# Patient Record
Sex: FEMALE | Race: WHITE
[De-identification: names, ages, dates, MRNs, and addresses within clinical notes are randomized per-mention and may not be internally consistent; named-entity substitution may affect disease eponyms.]

---

## 2017-05-19 ENCOUNTER — HOSPITAL ENCOUNTER (OUTPATIENT)
Dept: HOSPITAL 47 - LABWHC1 | Age: 82
Discharge: HOME | End: 2017-05-19
Payer: MEDICARE

## 2017-05-19 DIAGNOSIS — E78.5: Primary | ICD-10-CM

## 2017-05-19 DIAGNOSIS — I10: ICD-10-CM

## 2017-05-19 DIAGNOSIS — M81.0: ICD-10-CM

## 2017-05-19 LAB
ALP SERPL-CCNC: 68 U/L (ref 38–126)
ALT SERPL-CCNC: 29 U/L (ref 9–52)
ANION GAP SERPL CALC-SCNC: 11 MMOL/L
AST SERPL-CCNC: 24 U/L (ref 14–36)
BASOPHILS # BLD AUTO: 0.1 K/UL (ref 0–0.2)
BASOPHILS NFR BLD AUTO: 1 %
BUN SERPL-SCNC: 26 MG/DL (ref 7–17)
CALCIUM SPEC-MCNC: 9.5 MG/DL (ref 8.4–10.2)
CH: 29.5
CHCM: 33.2
CHLORIDE SERPL-SCNC: 105 MMOL/L (ref 98–107)
CHOLEST SERPL-MCNC: 187 MG/DL (ref ?–200)
CO2 SERPL-SCNC: 26 MMOL/L (ref 22–30)
EOSINOPHIL # BLD AUTO: 0.2 K/UL (ref 0–0.7)
EOSINOPHIL NFR BLD AUTO: 3 %
ERYTHROCYTE [DISTWIDTH] IN BLOOD BY AUTOMATED COUNT: 4.33 M/UL (ref 3.8–5.4)
ERYTHROCYTE [DISTWIDTH] IN BLOOD: 12.9 % (ref 11.5–15.5)
GLUCOSE SERPL-MCNC: 96 MG/DL (ref 74–99)
HCT VFR BLD AUTO: 38.6 % (ref 34–46)
HDLC SERPL-MCNC: 71 MG/DL (ref 40–60)
HDW: 2.43
HGB BLD-MCNC: 12.9 GM/DL (ref 11.4–16)
LUC NFR BLD AUTO: 2 %
LYMPHOCYTES # SPEC AUTO: 1.9 K/UL (ref 1–4.8)
LYMPHOCYTES NFR SPEC AUTO: 28 %
MCH RBC QN AUTO: 29.7 PG (ref 25–35)
MCHC RBC AUTO-ENTMCNC: 33.3 G/DL (ref 31–37)
MCV RBC AUTO: 89.1 FL (ref 80–100)
MONOCYTES # BLD AUTO: 0.4 K/UL (ref 0–1)
MONOCYTES NFR BLD AUTO: 6 %
NEUTROPHILS # BLD AUTO: 4.2 K/UL (ref 1.3–7.7)
NEUTROPHILS NFR BLD AUTO: 60 %
NON-AFRICAN AMERICAN GFR(MDRD): 53
POTASSIUM SERPL-SCNC: 4.1 MMOL/L (ref 3.5–5.1)
PROT SERPL-MCNC: 7.7 G/DL (ref 6.3–8.2)
SODIUM SERPL-SCNC: 142 MMOL/L (ref 137–145)
TRIGL SERPL-MCNC: 94 MG/DL (ref ?–150)
WBC # BLD AUTO: 0.15 10*3/UL
WBC # BLD AUTO: 6.9 K/UL (ref 3.8–10.6)
WBC (PEROX): 6.35

## 2017-05-19 PROCEDURE — 80053 COMPREHEN METABOLIC PANEL: CPT

## 2017-05-19 PROCEDURE — 84443 ASSAY THYROID STIM HORMONE: CPT

## 2017-05-19 PROCEDURE — 99213 OFFICE O/P EST LOW 20 MIN: CPT

## 2017-05-19 PROCEDURE — 82306 VITAMIN D 25 HYDROXY: CPT

## 2017-05-19 PROCEDURE — 85025 COMPLETE CBC W/AUTO DIFF WBC: CPT

## 2017-05-19 PROCEDURE — 36415 COLL VENOUS BLD VENIPUNCTURE: CPT

## 2017-05-19 PROCEDURE — 80061 LIPID PANEL: CPT

## 2017-05-19 PROCEDURE — 84439 ASSAY OF FREE THYROXINE: CPT

## 2017-12-18 ENCOUNTER — HOSPITAL ENCOUNTER (EMERGENCY)
Dept: HOSPITAL 47 - EC | Age: 82
Discharge: HOME | End: 2017-12-18
Payer: MEDICARE

## 2017-12-18 VITALS — DIASTOLIC BLOOD PRESSURE: 82 MMHG | TEMPERATURE: 98.2 F | SYSTOLIC BLOOD PRESSURE: 147 MMHG

## 2017-12-18 VITALS — RESPIRATION RATE: 18 BRPM | HEART RATE: 82 BPM

## 2017-12-18 DIAGNOSIS — S70.01XA: Primary | ICD-10-CM

## 2017-12-18 DIAGNOSIS — Z79.82: ICD-10-CM

## 2017-12-18 DIAGNOSIS — Z88.2: ICD-10-CM

## 2017-12-18 DIAGNOSIS — Z87.891: ICD-10-CM

## 2017-12-18 DIAGNOSIS — Z87.440: ICD-10-CM
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
## 2017-12-18 DIAGNOSIS — Z79.899: ICD-10-CM

## 2017-12-18 DIAGNOSIS — Z96.641: ICD-10-CM

## 2017-12-18 DIAGNOSIS — M19.90: ICD-10-CM

## 2017-12-18 PROCEDURE — 99284 EMERGENCY DEPT VISIT MOD MDM: CPT

## 2017-12-18 PROCEDURE — 73502 X-RAY EXAM HIP UNI 2-3 VIEWS: CPT

## 2017-12-18 PROCEDURE — 70450 CT HEAD/BRAIN W/O DYE: CPT

## 2017-12-18 PROCEDURE — 72125 CT NECK SPINE W/O DYE: CPT

## 2017-12-18 PROCEDURE — 96372 THER/PROPH/DIAG INJ SC/IM: CPT

## 2017-12-18 NOTE — ED
Fall HPI





- General


Chief Complaint: Fall


Stated Complaint: Fall


Time Seen by Provider: 12/18/17 17:28


Source: patient, EMS, RN notes reviewed


Mode of arrival: EMS





- History of Present Illness


Initial Comments: 





This is an 86-year-old female who presents to the emergency department via EMS 

with chief complaint of fall.  Patient states that she was leaving her house 

and entering her garage where she tripped and fell down 2 stairs.  She states 

she fell onto her right side.  Patient is concerned because she had a total hip 

arthroplasty in 2005.  She states that she is experiencing pain in her upper 

thigh and right hip.  She states that she only experiences hip pain with 

movement.  She states that she did hit her head on the concrete garage floor.  

Denies any loss of consciousness, dizziness, headache or nausea.  She denies 

any scalp tenderness or neck pain.  Patient states that she fell at 

approximately 4:15 this evening and it took her about half an hour to get back 

into the house to use the phone to call for help.  She denies frequent falls, 

stating that her last fall was a year and half ago while at [employer]. Denies fever

, chills, chest pain, shortness of breath, abdominal pain, nausea or vomiting, 

constipation or diarrhea, dysuria or hematuria, numbness or tingling, headache 

or vision changes.  





- Related Data


 Home Medications











 Medication  Instructions  Recorded  Confirmed


 


Multivitamins, Thera [Multivitamin 1 tab PO DAILY 09/16/15 12/18/17





(formulary)]   


 


Trimethoprim [Trimpex] 100 mg PO DAILY 09/16/15 12/18/17


 


Alendronate Sodium 70 mg PO MO 03/07/16 12/18/17


 


Glucosam/Trent-Msm1/C/Ambrose/Bosw 1 tab PO BID 03/07/16 12/18/17





[Glucosamine-Chondroitin Tablet]   


 


Ascorbic Acid [Vitamin C] 500 mg PO DAILY 12/18/17 12/18/17


 


Aspirin [Adult Low Dose Aspirin EC] 81 mg PO DAILY 12/18/17 12/18/17


 


Calcium Carbonate/Vitamin D3 1 tab PO DAILY 12/18/17 12/18/17





[Calcium 600-Vit D3 200 Tablet]   


 


Cholecalciferol [Vitamin D3] 2,000 unit PO DAILY 12/18/17 12/18/17


 


Cyanocobalamin (Vitamin B-12) 2,000 mcg PO DAILY 12/18/17 12/18/17





[Vitamin B-12]   








 Previous Rx's











 Medication  Instructions  Recorded


 


Atenolol [Tenormin] 12.5 mg PO BID  dose 03/19/16











 Allergies











Allergy/AdvReac Type Severity Reaction Status Date / Time


 


Sulfa (Sulfonamide Allergy  Rash/Hives Verified 12/18/17 18:16





Antibiotics)     














Review of Systems


ROS Statement: 


Those systems with pertinent positive or pertinent negative responses have been 

documented in the HPI.





ROS Other: All systems not noted in ROS Statement are negative.





Past Medical History


Past Medical History: Hypertension, Osteoarthritis (OA)


Additional Past Medical History / Comment(s): UTI, ON PROPHYLACTIC FOR UTI 

PREVENTION, HX POLYPS


History of Any Multi-Drug Resistant Organisms: None Reported


Past Surgical History: Joint Replacement


Additional Past Surgical History / Comment(s): right hip replacment. BREAST BX, 

RT CATARACT SX


Past Anesthesia/Blood Transfusion Reactions: No Reported Reaction


Past Psychological History: No Psychological Hx Reported


Smoking Status: Former smoker


Past Alcohol Use History: Rare


Past Drug Use History: None Reported





- Past Family History


  ** Father


Family Medical History: Cancer


Additional Family Medical History / Comment(s): colon CA





  ** Mother


Family Medical History: No Reported History





General Exam





- General Exam Comments


Initial Comments: 


General: Awake and alert, well-developed; in no apparent distress.  Pleasant 

and cooperative.


HEENT: Head atraumatic, normocephalic.  No hematoma, swelling or areas of 

tenderness noted.  Pupils are equal, round and reactive to light. Extraocular 

movements intact. Oropharynx moist without erythema or exudate. 


Neck: Supple. Normal ROM.  


Cardiovascular: Regular rate and rhythm. No murmurs, rubs or gallops. Chest 

symmetrical.  


Respiratory: Lungs clear to auscultation bilaterally. No wheezes, rales or 

rhonchi. Normal respiratory effort with no use of accessory muscles. 


Musculoskeletal: Limited active range of motion of the right hip due to pain.  

She is able to flex the hip minimally.  No tenderness on palpation of the upper 

thigh and hip.  No contusions, erythema or swelling noted.


Skin: Pink, warm and dry without rashes or lesions. 


Neurological: Alert and oriented x3. CN II-XII grossly intact. Speech is fluent 

and answers are appropriate. No focal neuro deficits. 


Psychiatric: Normal mood and affect. No overt signs of depression or anxiety 

noted. 














Limitations: no limitations





Course


 Vital Signs











  12/18/17 12/18/17 12/18/17





  17:18 18:49 19:46


 


Temperature 97.7 F  


 


Pulse Rate 79 75 82


 


Respiratory 16 16 18





Rate   


 


Blood Pressure 143/66 153/74 181/75


 


O2 Sat by Pulse 94 L 95 97





Oximetry   














  12/18/17





  20:14


 


Temperature 98.2 F


 


Pulse Rate 


 


Respiratory 





Rate 


 


Blood Pressure 147/82


 


O2 Sat by Pulse 





Oximetry 














Medical Decision Making





- Medical Decision Making


This is an 86-year-old female who presents to the emergency department for 

evaluation of fall.  Patient claims that she fell onto her right side onto her 

right hip.  She reports right hip pain.  X-ray of right hip and AP pelvis 

revealed no acute abnormalities.  Patient also admits to hitting her head.  She 

denied any loss of consciousness, dizziness, nausea or headache.  CT brain and C

-spine revealed no acute abnormalities.  Findings were discussed with patient 

who requested to receive some pain medication so that she could attempt to 

ambulate on her own without being in pain.  On reevaluation, patient was able 

to get up off the bed and ambulate on her own with minimal pain.  She'll be 

discharged home.  Recommended follow-up with her primary care provider in 1-2 

days.  She was advised to use her walker while ambulating.  Patient is in 

agreement with plan voices understanding.  All questions were answered.








- Radiology Data


Radiology results: report reviewed


X-ray right hip and AP pelvis findings: The right total hip replacement is 

intact.  There is no fracture or malalignment.  Soft tissues are unremarkable.  

Impression: No acute process.





CT brain and C-spine impression: 1.  There is no acute fracture or dislocation 

evident in the cervical spine.  2.  No acute intracranial hemorrhage, mass 

effect or midline shift seen.





Disposition


Clinical Impression: 


 Contusion of right hip





Disposition: HOME SELF-CARE


Condition: Good


Instructions:  Hip Contusion (ED)


Additional Instructions: 


Please follow up with primary care provider within 1-2 days. Return to 

emergency department if symptoms should worsen or any concerns arise. 


Referrals: 


Kayley Rao MD [Primary Care Provider] - 1-2 days


Time of Disposition: 20:17

## 2017-12-18 NOTE — CT
EXAMINATION TYPE: CT brain cspine wo con

 

DATE OF EXAM: 12/18/2017

 

COMPARISON: NONE

 

HISTORY: Fall today.  Right parietal head injury. Neck pain.

 

CT DLP: 1318.70 mGycm

Automated exposure control for dose reduction was used.

 

TECHNIQUE: CT scan of the head and cervical spine are performed without contrast.

 

FINDINGS:   There is no acute intracranial hemorrhage, mass effect, or midline shift identified.  The
 ventricles and sulci are within normal limits in size.  The globes are intact and the visualized sin
uses are clear.

 

Cervical spine is visualized in its entirety from C1 through upper thoracic levels and demonstrates s
atisfactory alignment without evidence of acute fracture or dislocation.  Prevertebral soft tissue ap
pears within normal limits.  The C1-C2 articulation is unremarkable.  

 

IMPRESSION:

1. There is no acute fracture or dislocation evident in the cervical spine.

2. No acute intracranial hemorrhage, mass effect, or midline shift is seen.

## 2017-12-18 NOTE — XR
PROCEDURE: XR Hip RT and AP Pelvis

DATE AND TIME: 12/18/2017 6:03 PM

 

REFERRING PHYSICIAN: Alaina Urbina

 

CLINICAL INDICATION: PHH, Pain

 

TECHNIQUE: Department protocol. 3 views.

 

COMPARISON: None

 

FINDINGS: The right THR is intact. There is no fracture or malalignment. The soft tissues are unremar
kable.

 

IMPRESSION:

NO ACUTE PROCESS.

## 2018-05-25 ENCOUNTER — HOSPITAL ENCOUNTER (OUTPATIENT)
Dept: HOSPITAL 47 - LABWHC1 | Age: 83
Discharge: HOME | End: 2018-05-25
Payer: MEDICARE

## 2018-05-25 DIAGNOSIS — E55.9: ICD-10-CM

## 2018-05-25 DIAGNOSIS — Z00.00: Primary | ICD-10-CM

## 2018-05-25 DIAGNOSIS — M81.0: ICD-10-CM

## 2018-05-25 DIAGNOSIS — I10: ICD-10-CM

## 2018-05-25 LAB
ALBUMIN SERPL-MCNC: 4.2 G/DL (ref 3.5–5)
ALP SERPL-CCNC: 66 U/L (ref 38–126)
ALT SERPL-CCNC: 31 U/L (ref 9–52)
ANION GAP SERPL CALC-SCNC: 14 MMOL/L
AST SERPL-CCNC: 26 U/L (ref 14–36)
BASOPHILS # BLD AUTO: 0 K/UL (ref 0–0.2)
BASOPHILS NFR BLD AUTO: 1 %
BUN SERPL-SCNC: 26 MG/DL (ref 7–17)
CALCIUM SPEC-MCNC: 9.6 MG/DL (ref 8.4–10.2)
CHLORIDE SERPL-SCNC: 104 MMOL/L (ref 98–107)
CHOLEST SERPL-MCNC: 184 MG/DL (ref ?–200)
CO2 SERPL-SCNC: 25 MMOL/L (ref 22–30)
EOSINOPHIL # BLD AUTO: 0.2 K/UL (ref 0–0.7)
EOSINOPHIL NFR BLD AUTO: 4 %
ERYTHROCYTE [DISTWIDTH] IN BLOOD BY AUTOMATED COUNT: 4.31 M/UL (ref 3.8–5.4)
ERYTHROCYTE [DISTWIDTH] IN BLOOD: 13.1 % (ref 11.5–15.5)
GLUCOSE SERPL-MCNC: 88 MG/DL (ref 74–99)
HCT VFR BLD AUTO: 37.3 % (ref 34–46)
HDLC SERPL-MCNC: 65 MG/DL (ref 40–60)
HGB BLD-MCNC: 12.1 GM/DL (ref 11.4–16)
LDLC SERPL CALC-MCNC: 94 MG/DL (ref 0–99)
LYMPHOCYTES # SPEC AUTO: 1.3 K/UL (ref 1–4.8)
LYMPHOCYTES NFR SPEC AUTO: 28 %
MCH RBC QN AUTO: 28.1 PG (ref 25–35)
MCHC RBC AUTO-ENTMCNC: 32.5 G/DL (ref 31–37)
MCV RBC AUTO: 86.6 FL (ref 80–100)
MONOCYTES # BLD AUTO: 0.3 K/UL (ref 0–1)
MONOCYTES NFR BLD AUTO: 7 %
NEUTROPHILS # BLD AUTO: 2.7 K/UL (ref 1.3–7.7)
NEUTROPHILS NFR BLD AUTO: 58 %
PLATELET # BLD AUTO: 240 K/UL (ref 150–450)
POTASSIUM SERPL-SCNC: 4.4 MMOL/L (ref 3.5–5.1)
PROT SERPL-MCNC: 7 G/DL (ref 6.3–8.2)
SODIUM SERPL-SCNC: 143 MMOL/L (ref 137–145)
T4 FREE SERPL-MCNC: 0.98 NG/DL (ref 0.78–2.19)
TRIGL SERPL-MCNC: 124 MG/DL (ref ?–150)
WBC # BLD AUTO: 4.7 K/UL (ref 3.8–10.6)

## 2018-05-25 PROCEDURE — 82306 VITAMIN D 25 HYDROXY: CPT

## 2018-05-25 PROCEDURE — 80061 LIPID PANEL: CPT

## 2018-05-25 PROCEDURE — 85025 COMPLETE CBC W/AUTO DIFF WBC: CPT

## 2018-05-25 PROCEDURE — 36415 COLL VENOUS BLD VENIPUNCTURE: CPT

## 2018-05-25 PROCEDURE — 84443 ASSAY THYROID STIM HORMONE: CPT

## 2018-05-25 PROCEDURE — 84439 ASSAY OF FREE THYROXINE: CPT

## 2018-05-25 PROCEDURE — 80053 COMPREHEN METABOLIC PANEL: CPT

## 2019-06-19 ENCOUNTER — HOSPITAL ENCOUNTER (OUTPATIENT)
Dept: HOSPITAL 47 - LABWHC1 | Age: 84
Discharge: HOME | End: 2019-06-19
Attending: INTERNAL MEDICINE
Payer: MEDICARE

## 2019-06-19 DIAGNOSIS — Z00.00: Primary | ICD-10-CM

## 2019-06-19 DIAGNOSIS — E55.9: ICD-10-CM

## 2019-06-19 LAB
ALBUMIN SERPL-MCNC: 4.4 G/DL (ref 3.8–4.9)
ALBUMIN/GLOB SERPL: 1.91 G/DL (ref 1.6–3.17)
ALP SERPL-CCNC: 57 U/L (ref 41–126)
ALT SERPL-CCNC: 22 U/L (ref 8–44)
ANION GAP SERPL CALC-SCNC: 7.8 MMOL/L (ref 4–12)
AST SERPL-CCNC: 26 U/L (ref 13–35)
BASOPHILS # BLD AUTO: 0 K/UL (ref 0–0.2)
BASOPHILS NFR BLD AUTO: 1 %
BUN SERPL-SCNC: 27 MG/DL (ref 9–27)
BUN/CREAT SERPL: 20.77 RATIO (ref 12–20)
CALCIUM SPEC-MCNC: 10.1 MG/DL (ref 8.7–10.3)
CHLORIDE SERPL-SCNC: 105 MMOL/L (ref 96–109)
CHOLEST SERPL-MCNC: 190 MG/DL (ref 0–200)
CO2 SERPL-SCNC: 26.2 MMOL/L (ref 21.6–31.8)
EOSINOPHIL # BLD AUTO: 0.2 K/UL (ref 0–0.7)
EOSINOPHIL NFR BLD AUTO: 4 %
ERYTHROCYTE [DISTWIDTH] IN BLOOD BY AUTOMATED COUNT: 4.42 M/UL (ref 3.8–5.4)
ERYTHROCYTE [DISTWIDTH] IN BLOOD: 13.9 % (ref 11.5–15.5)
GLOBULIN SER CALC-MCNC: 2.3 G/DL (ref 1.6–3.3)
GLUCOSE SERPL-MCNC: 91 MG/DL (ref 70–110)
HCT VFR BLD AUTO: 39.8 % (ref 34–46)
HDLC SERPL-MCNC: 69 MG/DL (ref 40–60)
HGB BLD-MCNC: 12.4 GM/DL (ref 11.4–16)
LDLC SERPL CALC-MCNC: 105.4 MG/DL (ref 0–131)
LYMPHOCYTES # SPEC AUTO: 1.5 K/UL (ref 1–4.8)
LYMPHOCYTES NFR SPEC AUTO: 38 %
MCH RBC QN AUTO: 28.1 PG (ref 25–35)
MCHC RBC AUTO-ENTMCNC: 31.2 G/DL (ref 31–37)
MCV RBC AUTO: 90.1 FL (ref 80–100)
MONOCYTES # BLD AUTO: 0.3 K/UL (ref 0–1)
MONOCYTES NFR BLD AUTO: 7 %
NEUTROPHILS # BLD AUTO: 1.9 K/UL (ref 1.3–7.7)
NEUTROPHILS NFR BLD AUTO: 48 %
PLATELET # BLD AUTO: 257 K/UL (ref 150–450)
POTASSIUM SERPL-SCNC: 4 MMOL/L (ref 3.5–5.5)
PROT SERPL-MCNC: 6.7 G/DL (ref 6.2–8.2)
SODIUM SERPL-SCNC: 139 MMOL/L (ref 135–145)
T4 FREE SERPL-MCNC: 1.1 NG/DL (ref 0.8–1.8)
TRIGL SERPL-MCNC: 78 MG/DL (ref 0–149)
VLDLC SERPL CALC-MCNC: 15.6 MG/DL (ref 5–40)
WBC # BLD AUTO: 4 K/UL (ref 3.8–10.6)

## 2019-06-19 PROCEDURE — 84439 ASSAY OF FREE THYROXINE: CPT

## 2019-06-19 PROCEDURE — 82306 VITAMIN D 25 HYDROXY: CPT

## 2019-06-19 PROCEDURE — 80061 LIPID PANEL: CPT

## 2019-06-19 PROCEDURE — 84443 ASSAY THYROID STIM HORMONE: CPT

## 2019-06-19 PROCEDURE — 85025 COMPLETE CBC W/AUTO DIFF WBC: CPT

## 2019-06-19 PROCEDURE — 80053 COMPREHEN METABOLIC PANEL: CPT

## 2019-06-19 PROCEDURE — 36415 COLL VENOUS BLD VENIPUNCTURE: CPT

## 2019-07-09 ENCOUNTER — HOSPITAL ENCOUNTER (OUTPATIENT)
Dept: HOSPITAL 47 - RADUSWWP | Age: 84
Discharge: HOME | End: 2019-07-09
Attending: INTERNAL MEDICINE
Payer: MEDICARE

## 2019-07-09 DIAGNOSIS — N28.9: Primary | ICD-10-CM

## 2019-07-09 PROCEDURE — 76770 US EXAM ABDO BACK WALL COMP: CPT

## 2019-07-09 NOTE — US
EXAMINATION TYPE: US kidneys/renal and bladder

 

DATE OF EXAM: 7/9/2019

 

COMPARISON: NONE

 

CLINICAL HISTORY: N18.3 renal failure stage 3.

 

EXAM MEASUREMENTS:

 

Right Kidney:  9.7 x 3.7 x 4.2 cm

Left Kidney: 9.7 x 4.5 x 3.7 cm

Post Void Residual Volume:  8.6 mL

 

 

 

Right Kidney: Diminished cortical medullary differentiation. Cortical renal thinning. Extrarenal pelv
is is prominent measuring 2.3 x 1.4 x 2.9 cm. No hydronephrosis.

Left Kidney: Diminished cortical medullary differentiation. Cortical renal thinning.  Possible small 
non obstructing calculus within the lower pole measuring 0.2 x 0.5 cm

Bladder: wnl 

**Bilateral Jets seen: no

**Normal Post Void Residual: Yes

 

There is no evidence for hydronephrosis at this point in time.   No masses are identified.  The urina
ry bladder is anechoic.  Bilateral ureteral jets are not seen.

 

IMPRESSION:

 

1. Sonographic sequela of medical renal disease.

2. Possible small nonobstructing 5 mm calculus on the left.

## 2019-08-12 ENCOUNTER — HOSPITAL ENCOUNTER (OUTPATIENT)
Dept: HOSPITAL 47 - LABWHC1 | Age: 84
End: 2019-08-12
Attending: INTERNAL MEDICINE
Payer: MEDICARE

## 2019-08-12 DIAGNOSIS — N19: Primary | ICD-10-CM

## 2019-08-12 LAB
ANION GAP SERPL CALC-SCNC: 7.6 MMOL/L (ref 4–12)
BUN SERPL-SCNC: 23 MG/DL (ref 9–27)
BUN/CREAT SERPL: 25.56 RATIO (ref 12–20)
CALCIUM SPEC-MCNC: 9.7 MG/DL (ref 8.7–10.3)
CHLORIDE SERPL-SCNC: 100 MMOL/L (ref 96–109)
CO2 SERPL-SCNC: 28.4 MMOL/L (ref 21.6–31.8)
GLUCOSE SERPL-MCNC: 100 MG/DL (ref 70–110)
POTASSIUM SERPL-SCNC: 4.6 MMOL/L (ref 3.5–5.5)
SODIUM SERPL-SCNC: 136 MMOL/L (ref 135–145)

## 2019-08-12 PROCEDURE — 36415 COLL VENOUS BLD VENIPUNCTURE: CPT

## 2019-08-12 PROCEDURE — 80048 BASIC METABOLIC PNL TOTAL CA: CPT

## 2020-01-28 ENCOUNTER — HOSPITAL ENCOUNTER (EMERGENCY)
Dept: HOSPITAL 47 - EC | Age: 85
Discharge: HOME | End: 2020-01-28
Payer: MEDICARE

## 2020-01-28 VITALS — TEMPERATURE: 97.6 F | RESPIRATION RATE: 18 BRPM

## 2020-01-28 VITALS — SYSTOLIC BLOOD PRESSURE: 140 MMHG | HEART RATE: 80 BPM | DIASTOLIC BLOOD PRESSURE: 70 MMHG

## 2020-01-28 DIAGNOSIS — Z87.440: ICD-10-CM

## 2020-01-28 DIAGNOSIS — Z88.2: ICD-10-CM

## 2020-01-28 DIAGNOSIS — Z87.891: ICD-10-CM

## 2020-01-28 DIAGNOSIS — M79.89: ICD-10-CM

## 2020-01-28 DIAGNOSIS — Z96.641: ICD-10-CM

## 2020-01-28 DIAGNOSIS — M17.11: Primary | ICD-10-CM

## 2020-01-28 DIAGNOSIS — Z79.899: ICD-10-CM

## 2020-01-28 DIAGNOSIS — Z79.82: ICD-10-CM

## 2020-01-28 PROCEDURE — 99284 EMERGENCY DEPT VISIT MOD MDM: CPT

## 2020-01-28 NOTE — US
EXAMINATION TYPE: US venous doppler duplex LE RT

 

DATE OF EXAM: 1/28/2020 2:17 PM

 

COMPARISON: Prior right lower extremity venous ultrasound May 4, 2016

 

CLINICAL HISTORY: pain, swelling. Pain and swelling right calf, worse  2 days.

 

SIDE PERFORMED: Right  

 

TECHNIQUE:  The lower extremity deep venous system is examined utilizing real time linear array sonog
munir with graded compression, doppler sonography and color-flow sonography.

 

VESSELS IMAGED:

External Iliac Vein (EIV)

Common Femoral Vein

Deep Femoral Vein

Greater Saphenous Vein *

Femoral Vein

Popliteal Vein

Small Saphenous Vein *

Proximal Calf Veins

(* superficial vessels)

 

 

 

Right Leg:  Negative for DVT

 

Grayscale, color doppler, spectral doppler imaging performed of the deep veins of the right lower ext
remity.  There is normal flow, compressibility, vascular waveforms.

 

IMPRESSION:  No ultrasound evidence for acute DVT in the right lower extremity.

## 2020-01-28 NOTE — ED
Extremity Problem HPI





- General


Chief complaint: Extremity Problem,Nontraumatic


Stated complaint: rt calf pain


Time Seen by Provider: 01/28/20 13:22


Source: patient, RN notes reviewed, old records reviewed


Mode of arrival: ambulatory


Limitations: no limitations





- History of Present Illness


Initial comments: 





88 year old female with CC of R calf pain, swelling, for 2 days. Patient reports

hx of arthritis in R knee. Patient states she was concerned with recent calf 

swelling and is concerned of blood clot.   She denies smoking, trauma, deniess 

history of blood clots. 





- Related Data


                                Home Medications











 Medication  Instructions  Recorded  Confirmed


 


Multivitamins, Thera [Multivitamin 1 tab PO DAILY 09/16/15 12/18/17





(formulary)]   


 


Trimethoprim [Trimpex] 100 mg PO DAILY 09/16/15 12/18/17


 


Alendronate Sodium 70 mg PO MO 03/07/16 12/18/17


 


Glucosam/Trent-Msm1/C/Ambrose/Bosw 1 tab PO BID 03/07/16 12/18/17





[Glucosamine-Chondroitin Tablet]   


 


Ascorbic Acid [Vitamin C] 500 mg PO DAILY 12/18/17 12/18/17


 


Aspirin [Adult Low Dose Aspirin EC] 81 mg PO DAILY 12/18/17 12/18/17


 


Calcium Carbonate/Vitamin D3 1 tab PO DAILY 12/18/17 12/18/17





[Calcium 600-Vit D3 200 Tablet]   


 


Cholecalciferol [Vitamin D3] 2,000 unit PO DAILY 12/18/17 12/18/17


 


Cyanocobalamin (Vitamin B-12) 2,000 mcg PO DAILY 12/18/17 12/18/17





[Vitamin B-12]   








                                  Previous Rx's











 Medication  Instructions  Recorded


 


Atenolol [Tenormin] 12.5 mg PO BID  dose 03/19/16











                                    Allergies











Allergy/AdvReac Type Severity Reaction Status Date / Time


 


Sulfa (Sulfonamide Allergy  Rash/Hives Verified 12/18/17 18:16





Antibiotics)     














Review of Systems


ROS Statement: 


Those systems with pertinent positive or pertinent negative responses have been 

documented in the HPI.





ROS Other: All systems not noted in ROS Statement are negative.





Past Medical History


Past Medical History: Hypertension, Osteoarthritis (OA)


Additional Past Medical History / Comment(s): UTI, ON PROPHYLACTIC FOR UTI 

PREVENTION, HX POLYPS


History of Any Multi-Drug Resistant Organisms: None Reported


Past Surgical History: Joint Replacement


Additional Past Surgical History / Comment(s): right hip replacment. BREAST BX, 

RT CATARACT SX


Past Anesthesia/Blood Transfusion Reactions: No Reported Reaction


Past Psychological History: No Psychological Hx Reported


Smoking Status: Former smoker


Past Alcohol Use History: Rare


Past Drug Use History: None Reported





- Past Family History


  ** Father


Family Medical History: Cancer


Additional Family Medical History / Comment(s): colon CA





  ** Mother


Family Medical History: No Reported History





General Exam





- General Exam Comments


Initial Comments: 





88year old  female, no distress. 


Limitations: no limitations


General appearance: alert, in no apparent distress


Head exam: Present: atraumatic, normocephalic, normal inspection


Eye exam: Present: normal appearance, PERRL, EOMI.  Absent: scleral icterus, 

conjunctival injection, periorbital swelling


ENT exam: Present: normal exam, mucous membranes moist


Neck exam: Present: normal inspection.  Absent: tenderness, meningismus, 

lymphadenopathy


Respiratory exam: Present: normal lung sounds bilaterally.  Absent: respiratory 

distress, wheezes, rales, rhonchi, stridor


Cardiovascular Exam: Present: regular rate, normal rhythm, normal heart sounds. 

 Absent: systolic murmur, diastolic murmur, rubs, gallop, clicks


GI/Abdominal exam: Present: soft, normal bowel sounds.  Absent: distended, 

tenderness, guarding, rebound, rigid


Extremities exam: Present: normal inspection, full ROM, normal capillary refill.

  Absent: tenderness, pedal edema, joint swelling, calf tenderness


  ** Right


Knee exam: Present: swelling.  Absent: normal inspection, abrasion, laceration


Lower Leg exam: Present: normal inspection, full ROM, tenderness (over posterior

 knee into calf. )


Back exam: Present: normal inspection


Neurological exam: Present: alert, oriented X3, CN II-XII intact


Psychiatric exam: Present: normal affect, normal mood


Skin exam: Present: warm, dry, intact, normal color.  Absent: rash





Course


                                   Vital Signs











  01/28/20 01/28/20





  12:59 15:35


 


Temperature 97.6 F 


 


Pulse Rate 83 80


 


Respiratory 18 18





Rate  


 


Blood Pressure 147/73 140/70


 


O2 Sat by Pulse 98 98





Oximetry  














Medical Decision Making





- Medical Decision Making





88 year old with R knee and calf pain. Patient R knee has significant arthritis 

from xray, US is negative for DVT. Discussed swelling is likely related to 

arthirits. Discussed ortho follow up and return parameters discussed. Discussed 

antiinflammatories medications. 





- Radiology Data


Radiology results: report reviewed


Us is negative for DVT. There is no acute fracture or dislocation in right knee.

 Severe patellofemoral arthropathy and moderate complarmental arthropathy and 

noted bicomplarmental chordocalcinosis. 





Disposition


Clinical Impression: 


 Arthritis of right knee, Leg swelling





Disposition: HOME SELF-CARE


Condition: Good


Instructions (If sedation given, give patient instructions):  Leg Edema (ED), 

Knee Pain (ED)


Additional Instructions: 


Is advised to rest, ice, and elevate the leg.  Take anti-inflammatory medicine s

uch as Aleve twice a day.  Wear the Ace wrap as well.  Follow-up with her 

primary care doctor as well as orthopedics if symptoms continue to persist.


Is patient prescribed a controlled substance at d/c from ED?: No


Referrals: 


Kayley Rao MD [Primary Care Provider] - 1-2 days


Time of Disposition: 15:31

## 2020-01-28 NOTE — XR
EXAMINATION TYPE: XR knee complete RT

 

DATE OF EXAM: 1/28/2020

 

CLINICAL HISTORY: Chronic right knee pain with new right lower extremity swelling

 

TECHNIQUE:  Three views of the right knee are obtained.

 

COMPARISON: None.

 

FINDINGS:  There is no acute fracture/dislocation evident in right knee. Advanced atherosclerosis of 
the femoral artery and its branches.  The tri-compartment joint spaces appear aligned. There is media
l compartment joint space narrowing, bicompartmental chondrocalcinosis and tricompartmental small ost
eophytes. Patellofemoral compartment joint space narrowing is severe. The overlying soft tissue appea
rs unremarkable radiographically.

 

IMPRESSION:  There is no acute fracture or dislocation in the right knee. Severe patellofemoral arthr
opathy and moderate bicompartmental arthropathy with incidentally noted bicompartmental chondrocalcin
osis.

## 2020-02-10 ENCOUNTER — HOSPITAL ENCOUNTER (EMERGENCY)
Dept: HOSPITAL 47 - EC | Age: 85
Discharge: HOME | End: 2020-02-10
Payer: MEDICARE

## 2020-02-10 VITALS — DIASTOLIC BLOOD PRESSURE: 68 MMHG | HEART RATE: 89 BPM | RESPIRATION RATE: 20 BRPM | SYSTOLIC BLOOD PRESSURE: 150 MMHG

## 2020-02-10 VITALS — TEMPERATURE: 97.7 F

## 2020-02-10 DIAGNOSIS — I10: ICD-10-CM

## 2020-02-10 DIAGNOSIS — Z96.641: ICD-10-CM

## 2020-02-10 DIAGNOSIS — Z79.82: ICD-10-CM

## 2020-02-10 DIAGNOSIS — Z87.891: ICD-10-CM

## 2020-02-10 DIAGNOSIS — M71.21: Primary | ICD-10-CM

## 2020-02-10 DIAGNOSIS — Z88.2: ICD-10-CM

## 2020-02-10 DIAGNOSIS — M79.661: ICD-10-CM

## 2020-02-10 LAB
ANION GAP SERPL CALC-SCNC: 7 MMOL/L
BASOPHILS # BLD AUTO: 0.1 K/UL (ref 0–0.2)
BASOPHILS NFR BLD AUTO: 1 %
BUN SERPL-SCNC: 34 MG/DL (ref 7–17)
CALCIUM SPEC-MCNC: 9.9 MG/DL (ref 8.4–10.2)
CHLORIDE SERPL-SCNC: 99 MMOL/L (ref 98–107)
CO2 SERPL-SCNC: 26 MMOL/L (ref 22–30)
EOSINOPHIL # BLD AUTO: 0.3 K/UL (ref 0–0.7)
EOSINOPHIL NFR BLD AUTO: 4 %
ERYTHROCYTE [DISTWIDTH] IN BLOOD BY AUTOMATED COUNT: 4.14 M/UL (ref 3.8–5.4)
ERYTHROCYTE [DISTWIDTH] IN BLOOD: 13.1 % (ref 11.5–15.5)
GLUCOSE SERPL-MCNC: 84 MG/DL (ref 74–99)
HCT VFR BLD AUTO: 36.1 % (ref 34–46)
HGB BLD-MCNC: 11.8 GM/DL (ref 11.4–16)
LYMPHOCYTES # SPEC AUTO: 1.4 K/UL (ref 1–4.8)
LYMPHOCYTES NFR SPEC AUTO: 21 %
MCH RBC QN AUTO: 28.4 PG (ref 25–35)
MCHC RBC AUTO-ENTMCNC: 32.5 G/DL (ref 31–37)
MCV RBC AUTO: 87.2 FL (ref 80–100)
MONOCYTES # BLD AUTO: 0.5 K/UL (ref 0–1)
MONOCYTES NFR BLD AUTO: 8 %
NEUTROPHILS # BLD AUTO: 4.2 K/UL (ref 1.3–7.7)
NEUTROPHILS NFR BLD AUTO: 64 %
PLATELET # BLD AUTO: 246 K/UL (ref 150–450)
POTASSIUM SERPL-SCNC: 4.6 MMOL/L (ref 3.5–5.1)
SODIUM SERPL-SCNC: 132 MMOL/L (ref 137–145)
WBC # BLD AUTO: 6.6 K/UL (ref 3.8–10.6)

## 2020-02-10 PROCEDURE — 36415 COLL VENOUS BLD VENIPUNCTURE: CPT

## 2020-02-10 PROCEDURE — 85025 COMPLETE CBC W/AUTO DIFF WBC: CPT

## 2020-02-10 PROCEDURE — 99284 EMERGENCY DEPT VISIT MOD MDM: CPT

## 2020-02-10 PROCEDURE — 80048 BASIC METABOLIC PNL TOTAL CA: CPT

## 2020-02-10 NOTE — ED
Lower Extremity Injury HPI





- General


Chief Complaint: Extremity Injury, Lower


Stated Complaint: right leg swelling


Time Seen by Provider: 02/10/20 16:56


Source: patient


Mode of arrival: ambulatory


Limitations: no limitations





- History of Present Illness


Initial Comments: 


88-year-old female presents today for chief complaint of right calf pain ongoing

x 1 week (-) Duplex one week prior. Patient states she has had swelling and pain

in the calf, posteior knee. Concerned of blood clots, denies redness, denies 

fever, general malaise, denies fevers, chest pain or SOB. She denies history of 

blood clots, cancer, recent surgeries or hospitalizations, exogenous hormone 

use. Patient denies redness, swelling or decreased ROM of the knee. Patient 

states the pain is most prevelant when sitting down for long periods of them 

then ambulating and in the mornings. States pain is from proximal to mid calf. 

Increases with palpation or ROM of the knee. Patient ambulatory and appears well

on arrival to the ER. Pt afebrile on arrival. Denies use of blood thinners.








- Related Data


                                Home Medications











 Medication  Instructions  Recorded  Confirmed


 


Multivitamins, Thera [Multivitamin 1 tab PO DAILY 09/16/15 12/18/17





(formulary)]   


 


Trimethoprim [Trimpex] 100 mg PO DAILY 09/16/15 12/18/17


 


Alendronate Sodium 70 mg PO MO 03/07/16 12/18/17


 


Glucosam/Trent-Msm1/C/Ambrose/Bosw 1 tab PO BID 03/07/16 12/18/17





[Glucosamine-Chondroitin Tablet]   


 


Ascorbic Acid [Vitamin C] 500 mg PO DAILY 12/18/17 12/18/17


 


Aspirin [Adult Low Dose Aspirin EC] 81 mg PO DAILY 12/18/17 12/18/17


 


Calcium Carbonate/Vitamin D3 1 tab PO DAILY 12/18/17 12/18/17





[Calcium 600-Vit D3 200 Tablet]   


 


Cholecalciferol [Vitamin D3] 2,000 unit PO DAILY 12/18/17 12/18/17


 


Cyanocobalamin (Vitamin B-12) 2,000 mcg PO DAILY 12/18/17 12/18/17





[Vitamin B-12]   








                                  Previous Rx's











 Medication  Instructions  Recorded


 


Atenolol [Tenormin] 12.5 mg PO BID  dose 03/19/16











                                    Allergies











Allergy/AdvReac Type Severity Reaction Status Date / Time


 


Sulfa (Sulfonamide Allergy  Rash/Hives Verified 02/10/20 16:20





Antibiotics)     














Review of Systems


ROS Statement: 


Those systems with pertinent positive or pertinent negative responses have been 

documented in the HPI.





ROS Other: All systems not noted in ROS Statement are negative.





Past Medical History


Past Medical History: Hypertension, Osteoarthritis (OA)


Additional Past Medical History / Comment(s): UTI, ON PROPHYLACTIC FOR UTI 

PREVENTION, HX POLYPS


History of Any Multi-Drug Resistant Organisms: None Reported


Past Surgical History: Joint Replacement


Additional Past Surgical History / Comment(s): right hip replacment. BREAST BX, 

RT CATARACT SX


Past Anesthesia/Blood Transfusion Reactions: No Reported Reaction


Past Psychological History: No Psychological Hx Reported


Smoking Status: Former smoker


Past Alcohol Use History: Rare


Past Drug Use History: None Reported





- Past Family History


  ** Father


Family Medical History: Cancer


Additional Family Medical History / Comment(s): colon CA





  ** Mother


Family Medical History: No Reported History





General Exam





- General Exam Comments


Initial Comments: 


General:  The patient is awake and alert, in no distress, and does not appear 

acutely ill. 


Eye:  +3 mm pupils are equal, round and reactive to light, extra-ocular 

movements are intact.  No nystagmus.  There is normal conjunctiva bilaterally.  

No signs of icterus.  


Ears, nose, mouth and throat:  There are moist mucous membranes and no oral 

lesions. 


Neck:  The neck is supple, there is no tenderness or JVD.  


Cardiovascular:  There is a regular rate and rhythm. No murmur, rub or gallop is

appreciated.


Respiratory:  Lungs are clear to auscultation, respirations are non-labored, 

breath sounds are equal.  No wheezes, stridor, rales, or rhonchi.


Musculoskeletal:  Normal ROM at the knees b/l, slight pain in calf, posterior 

knee with ROM at the right knee otherwise no pain with movement of joints of the

LE b/l. Strength 5/5 of the LE b/l including knees and ankles. Sensation intact.

DP pulses equal bilaterally 2+.  


Neurological:  A&O x 3. CN II-XII intact grossly, There are no obvious motor or 

sensory deficits. Coordination appears grossly intact. Speech is normal.


Skin:  Skin is warm and dry and no rashes or lesions are noted. Soft tissue sw

elling without redness, masses of the right calf/posterior knee in comparison 

with left. Point localized tenderness of posterior knee extending into mid calf.


Psychiatric:  Cooperative, appropriate mood & affect, normal judgment.  





Limitations: no limitations





Course


                                   Vital Signs











  02/10/20 02/10/20 02/10/20





  16:21 18:16 19:41


 


Temperature 97.7 F 97.7 F 


 


Pulse Rate 99 99 89


 


Respiratory 18 18 20





Rate   


 


Blood Pressure 155/66 155/66 150/68


 


O2 Sat by Pulse 96 96 





Oximetry   














Medical Decision Making





- Medical Decision Making


US + for a very large bakers cyst.  Patient has no redness masses no fever or 

leukocytosis at this time I feel patient's symptoms coincide with ultrasound 

findings.  There is no evidence of deep venous thrombosis. Patient able to 

ambulate. Patient has mild increase in BUN and Cr, but states she has not had 

anything to drink today, encouraged fluids. At this time feel patient is stable 

for discharge with outpatient orthopedic follow-up for possible drainage of 

large bakers cyst.  Patient is agreeable to this care plan is aware of the 

importance of return parameters and follow-up.  Discharged appearing well after 

discussing case with Dr. Mohr.











- Lab Data


Result diagrams: 


                                 02/10/20 18:15





                                 02/10/20 18:15


                                   Lab Results











  02/10/20 02/10/20 Range/Units





  18:15 18:15 


 


WBC   6.6  (3.8-10.6)  k/uL


 


RBC   4.14  (3.80-5.40)  m/uL


 


Hgb   11.8  (11.4-16.0)  gm/dL


 


Hct   36.1  (34.0-46.0)  %


 


MCV   87.2  (80.0-100.0)  fL


 


MCH   28.4  (25.0-35.0)  pg


 


MCHC   32.5  (31.0-37.0)  g/dL


 


RDW   13.1  (11.5-15.5)  %


 


Plt Count   246  (150-450)  k/uL


 


Neutrophils %   64  %


 


Lymphocytes %   21  %


 


Monocytes %   8  %


 


Eosinophils %   4  %


 


Basophils %   1  %


 


Neutrophils #   4.2  (1.3-7.7)  k/uL


 


Lymphocytes #   1.4  (1.0-4.8)  k/uL


 


Monocytes #   0.5  (0-1.0)  k/uL


 


Eosinophils #   0.3  (0-0.7)  k/uL


 


Basophils #   0.1  (0-0.2)  k/uL


 


Sodium  132 L   (137-145)  mmol/L


 


Potassium  4.6   (3.5-5.1)  mmol/L


 


Chloride  99   ()  mmol/L


 


Carbon Dioxide  26   (22-30)  mmol/L


 


Anion Gap  7   mmol/L


 


BUN  34 H   (7-17)  mg/dL


 


Creatinine  1.10 H   (0.52-1.04)  mg/dL


 


Est GFR (CKD-EPI)AfAm  52   (>60 ml/min/1.73 sqM)  


 


Est GFR (CKD-EPI)NonAf  45   (>60 ml/min/1.73 sqM)  


 


Glucose  84   (74-99)  mg/dL


 


Calcium  9.9   (8.4-10.2)  mg/dL














Disposition


Clinical Impression: 


 Synovial cyst of popliteal space [Sow], right knee, Right calf pain





Disposition: HOME SELF-CARE


Condition: Good


Instructions (If sedation given, give patient instructions):  Bakers Cyst (ED)


Additional Instructions: 


Please use medication as discussed.  Please follow-up with orthopedic associated

 in 2-3 days. Return for increasing pain swelling, or if you develop  redness, 

fevers.  Please return to emergency room if the symptoms increase or worsen or 

for any other concerns.


Is patient prescribed a controlled substance at d/c from ED?: No


Referrals: 


Kayley Rao MD [Primary Care Provider] - 1-2 days


Jose Ellison MD [Medical Doctor] - 1-2 days


Time of Disposition: 19:26

## 2020-02-10 NOTE — US
EXAMINATION TYPE: US venous doppler duplex LE RT

 

DATE OF EXAM: 2/10/2020 6:46 PM

 

COMPARISON: US

 

CLINICAL HISTORY: swelling. Swelling x couple weeks. No hx of DVT. Patient takes aspirin.

 

SIDE PERFORMED: Right  

 

TECHNIQUE:  The lower extremity deep venous system is examined utilizing real time linear array sonog
munir with graded compression, doppler sonography and color-flow sonography.

 

VESSELS IMAGED:

External Iliac Vein (EIV)

Common Femoral Vein

Deep Femoral Vein

Greater Saphenous Vein *

Femoral Vein

Popliteal Vein

Small Saphenous Vein *

Proximal Calf Veins

(* superficial vessels)

 

 

 

Right Leg:  There is no evidence of DVT in veins imaged at this time from prox calf veins to EIV. The
re appears to be a complex area at the anterior-medial calf that starts just below the knee and exten
ds inferiorly. This area measures approximately: 10.8 x 1.7 x 1.2 cm.  

 

 

 

IMPRESSION:  

1. No evidence for DVT. Complex Sow's cyst suggested.

## 2020-02-10 NOTE — XR
EXAMINATION TYPE: XR tibia fibula RT

 

DATE OF EXAM: 2/10/2020

 

CLINICAL HISTORY: pain

 

TECHNIQUE:  AP and lateral images of the right tibia and fibula are obtained.

 

COMPARISON: None.

 

FINDINGS:  There is no acute fracture/dislocation evident. The joint spaces  appear within normal tompkins
its.  The overlying soft tissue appears unremarkable.

 

IMPRESSION:  

 

There is no acute fracture or dislocation seen.

 

ICD 10 NO FRACTURE, INITIAL EVALUATION

## 2020-06-17 ENCOUNTER — HOSPITAL ENCOUNTER (OUTPATIENT)
Dept: HOSPITAL 47 - LABWHC1 | Age: 85
Discharge: HOME | End: 2020-06-17
Attending: INTERNAL MEDICINE
Payer: MEDICARE

## 2020-06-17 DIAGNOSIS — I10: ICD-10-CM

## 2020-06-17 DIAGNOSIS — E78.5: Primary | ICD-10-CM

## 2020-06-17 LAB
ALBUMIN SERPL-MCNC: 4.3 G/DL (ref 3.8–4.9)
ALBUMIN/GLOB SERPL: 1.72 G/DL (ref 1.6–3.17)
ALP SERPL-CCNC: 62 U/L (ref 41–126)
ALT SERPL-CCNC: 22 U/L (ref 8–44)
ANION GAP SERPL CALC-SCNC: 10.2 MMOL/L (ref 4–12)
AST SERPL-CCNC: 27 U/L (ref 13–35)
BASOPHILS # BLD AUTO: 0.1 K/UL (ref 0–0.2)
BASOPHILS NFR BLD AUTO: 1 %
BUN SERPL-SCNC: 24 MG/DL (ref 9–27)
BUN/CREAT SERPL: 24 RATIO (ref 12–20)
CALCIUM SPEC-MCNC: 9.9 MG/DL (ref 8.7–10.3)
CHLORIDE SERPL-SCNC: 104 MMOL/L (ref 96–109)
CHOLEST SERPL-MCNC: 169 MG/DL (ref 0–200)
CO2 SERPL-SCNC: 24.8 MMOL/L (ref 21.6–31.8)
EOSINOPHIL # BLD AUTO: 0.2 K/UL (ref 0–0.7)
EOSINOPHIL NFR BLD AUTO: 4 %
ERYTHROCYTE [DISTWIDTH] IN BLOOD BY AUTOMATED COUNT: 4.09 M/UL (ref 3.8–5.4)
ERYTHROCYTE [DISTWIDTH] IN BLOOD: 12.9 % (ref 11.5–15.5)
GLOBULIN SER CALC-MCNC: 2.5 G/DL (ref 1.6–3.3)
GLUCOSE SERPL-MCNC: 94 MG/DL (ref 70–110)
HCT VFR BLD AUTO: 37 % (ref 34–46)
HDLC SERPL-MCNC: 65 MG/DL (ref 40–60)
HGB BLD-MCNC: 12 GM/DL (ref 11.4–16)
LDLC SERPL CALC-MCNC: 89 MG/DL (ref 0–131)
LYMPHOCYTES # SPEC AUTO: 1.5 K/UL (ref 1–4.8)
LYMPHOCYTES NFR SPEC AUTO: 31 %
MCH RBC QN AUTO: 29.3 PG (ref 25–35)
MCHC RBC AUTO-ENTMCNC: 32.4 G/DL (ref 31–37)
MCV RBC AUTO: 90.3 FL (ref 80–100)
MONOCYTES # BLD AUTO: 0.4 K/UL (ref 0–1)
MONOCYTES NFR BLD AUTO: 7 %
NEUTROPHILS # BLD AUTO: 2.6 K/UL (ref 1.3–7.7)
NEUTROPHILS NFR BLD AUTO: 54 %
PLATELET # BLD AUTO: 249 K/UL (ref 150–450)
POTASSIUM SERPL-SCNC: 4 MMOL/L (ref 3.5–5.5)
PROT SERPL-MCNC: 6.8 G/DL (ref 6.2–8.2)
SODIUM SERPL-SCNC: 139 MMOL/L (ref 135–145)
T4 FREE SERPL-MCNC: 1.1 NG/DL (ref 0.8–1.8)
TRIGL SERPL-MCNC: 75 MG/DL (ref 0–149)
VLDLC SERPL CALC-MCNC: 15 MG/DL (ref 5–40)
WBC # BLD AUTO: 4.8 K/UL (ref 3.8–10.6)

## 2020-06-17 PROCEDURE — 36415 COLL VENOUS BLD VENIPUNCTURE: CPT

## 2020-06-17 PROCEDURE — 80061 LIPID PANEL: CPT

## 2020-06-17 PROCEDURE — 85025 COMPLETE CBC W/AUTO DIFF WBC: CPT

## 2020-06-17 PROCEDURE — 84439 ASSAY OF FREE THYROXINE: CPT

## 2020-06-17 PROCEDURE — 84443 ASSAY THYROID STIM HORMONE: CPT

## 2020-06-17 PROCEDURE — 80053 COMPREHEN METABOLIC PANEL: CPT
